# Patient Record
Sex: FEMALE | Race: WHITE | NOT HISPANIC OR LATINO | Employment: UNEMPLOYED | URBAN - NONMETROPOLITAN AREA
[De-identification: names, ages, dates, MRNs, and addresses within clinical notes are randomized per-mention and may not be internally consistent; named-entity substitution may affect disease eponyms.]

---

## 2018-02-01 ENCOUNTER — DOCUMENTATION ONLY (OUTPATIENT)
Dept: FAMILY MEDICINE | Facility: OTHER | Age: 53
End: 2018-02-01

## 2018-02-01 RX ORDER — PANTOPRAZOLE SODIUM 40 MG/1
40 TABLET, DELAYED RELEASE ORAL DAILY
COMMUNITY
Start: 2016-07-27 | End: 2022-03-22

## 2019-07-25 ENCOUNTER — HOSPITAL ENCOUNTER (OUTPATIENT)
Dept: MRI IMAGING | Facility: OTHER | Age: 54
Discharge: HOME | End: 2019-07-25
Attending: NURSE PRACTITIONER | Admitting: FAMILY MEDICINE
Payer: COMMERCIAL

## 2019-07-25 DIAGNOSIS — M51.369 DDD (DEGENERATIVE DISC DISEASE), LUMBAR: ICD-10-CM

## 2019-07-25 DIAGNOSIS — M48.061 SPINAL STENOSIS, LUMBAR REGION, WITHOUT NEUROGENIC CLAUDICATION: ICD-10-CM

## 2019-07-25 DIAGNOSIS — M54.50 LOW BACK PAIN: ICD-10-CM

## 2019-07-25 PROCEDURE — 72148 MRI LUMBAR SPINE W/O DYE: CPT

## 2020-02-01 ENCOUNTER — OFFICE VISIT (OUTPATIENT)
Dept: FAMILY MEDICINE | Facility: OTHER | Age: 55
End: 2020-02-01
Attending: NURSE PRACTITIONER
Payer: OTHER GOVERNMENT

## 2020-02-01 VITALS
RESPIRATION RATE: 16 BRPM | DIASTOLIC BLOOD PRESSURE: 60 MMHG | OXYGEN SATURATION: 98 % | TEMPERATURE: 98.2 F | WEIGHT: 118.4 LBS | BODY MASS INDEX: 20.22 KG/M2 | HEIGHT: 64 IN | HEART RATE: 84 BPM | SYSTOLIC BLOOD PRESSURE: 98 MMHG

## 2020-02-01 DIAGNOSIS — R07.0 THROAT PAIN: Primary | ICD-10-CM

## 2020-02-01 LAB
SPECIMEN SOURCE: NORMAL
STREP GROUP A PCR: NOT DETECTED

## 2020-02-01 PROCEDURE — 99203 OFFICE O/P NEW LOW 30 MIN: CPT | Performed by: NURSE PRACTITIONER

## 2020-02-01 PROCEDURE — 87651 STREP A DNA AMP PROBE: CPT | Mod: ZL | Performed by: NURSE PRACTITIONER

## 2020-02-01 ASSESSMENT — ENCOUNTER SYMPTOMS
EYES NEGATIVE: 1
FATIGUE: 0
COUGH: 1
SINUS PRESSURE: 0
CHILLS: 0
SINUS PAIN: 0
FEVER: 0
VOICE CHANGE: 1
SORE THROAT: 1
FACIAL SWELLING: 0

## 2020-02-01 ASSESSMENT — MIFFLIN-ST. JEOR: SCORE: 1132.06

## 2020-02-01 ASSESSMENT — PAIN SCALES - GENERAL: PAINLEVEL: NO PAIN (0)

## 2020-02-01 NOTE — PROGRESS NOTES
SUBJECTIVE:   Beverley Henry is a 54 year old female presenting with a chief complaint of   Chief Complaint   Patient presents with     Throat Problem       She is an established patient of [facility].    UR Adult    Onset of symptoms was 3 day(s) ago.  Course of illness is same.    Severity moderate  Current and Associated symptoms: runny nose, cough - non-productive, ear pain right and sore throat  Treatment measures tried include None tried.  Predisposing factors include None.        Review of Systems   Constitutional: Negative for chills, fatigue and fever.   HENT: Positive for ear pain, postnasal drip, sore throat and voice change. Negative for facial swelling, sinus pressure and sinus pain.    Eyes: Negative.    Respiratory: Positive for cough.        No past medical history on file.  Family History   Problem Relation Age of Onset     Breast Cancer Maternal Grandmother 40        Cancer-breast     Colon Cancer Paternal Grandmother 60        Cancer-colon     Colon Cancer Paternal Grandfather 60        Cancer-colon     Breast Cancer Maternal Aunt 58        Cancer-breast     Diabetes Maternal Aunt         Diabetes     Heart Disease Maternal Uncle         Heart Disease,CABG     Cancer Father 40        Cancer,lung cancer --smoker--mesothelioma     Family History Negative Brother         Good Health     Cancer Paternal Aunt 60        Cancer,lung cancer     Family History Negative Sister         Good Health     Anesthesia Reaction No family hx of         Anesthesia Problem     Blood Disease No family hx of         Blood Disease     Other - See Comments No family hx of         Stroke     Thyroid Disease No family hx of         Thyroid Disease     Seizure Disorder No family hx of         Seizures     Prostate Cancer No family hx of         Cancer-prostate     Ovarian Cancer No family hx of         Cancer-ovarian     Current Outpatient Medications   Medication Sig Dispense Refill     pantoprazole (PROTONIX) 40 MG EC  "tablet Take 40 mg by mouth daily       Social History     Tobacco Use     Smoking status: Never Smoker     Smokeless tobacco: Never Used   Substance Use Topics     Alcohol use: No       OBJECTIVE  BP 98/60 (BP Location: Right arm, Patient Position: Sitting, Cuff Size: Adult Regular)   Pulse 84   Temp 98.2  F (36.8  C) (Tympanic)   Resp 16   Ht 1.626 m (5' 4\")   Wt 53.7 kg (118 lb 6.4 oz)   SpO2 98%   Breastfeeding No   BMI 20.32 kg/m      Physical Exam  Vitals signs and nursing note reviewed.   Constitutional:       General: She is not in acute distress.     Appearance: Normal appearance. She is normal weight. She is not ill-appearing.   HENT:      Head: Normocephalic.      Right Ear: Tympanic membrane normal.      Left Ear: Tympanic membrane normal.      Nose: Rhinorrhea present. No congestion.      Mouth/Throat:      Mouth: Mucous membranes are moist.      Pharynx: Oropharynx is clear. Posterior oropharyngeal erythema present. No oropharyngeal exudate.   Cardiovascular:      Rate and Rhythm: Normal rate and regular rhythm.   Pulmonary:      Effort: Pulmonary effort is normal.      Breath sounds: Normal breath sounds.   Neurological:      Mental Status: She is alert.         Labs:  Results for orders placed or performed in visit on 02/01/20 (from the past 24 hour(s))   Group A Streptococcus PCR Throat Swab   Result Value Ref Range    Specimen Description Throat     Strep Group A PCR Not Detected NDET^Not Detected       X-Ray was not done.    ASSESSMENT:      ICD-10-CM    1. Throat pain R07.0 Group A Streptococcus PCR Throat Swab        Medical Decision Making:    Differential Diagnosis:  URI Adult/Peds:  Acute right otitis media, Strep pharyngitis, Viral pharyngitis and Viral upper respiratory illness    Serious Comorbid Conditions:  Adult:  None    PLAN:    URI Adult:  Tylenol, Fluids, Rest, OTC decongestant/antihistamine and Saline gargles.  Pt was concerned that she had strep and was leaving on a trip " early next week.   Reviewed self care measures, S& Sx to return.   Pt in agreement with plan and has no further questions.     Followup:    If not improving or if condition worsens, follow up with your Primary Care Provider    Patient Instructions   What am I to do (the plan):   O  Get plenty of fluids to drink.  O  Make sure that you get plenty of rest.   O  Take Acetaminophen (Tylenol) or Ibuprofen (Motrin or Advil) for fever or discomfort.  O  May try Diphenhydramine at bedtime only.  Do not use for more than 3-4 days in a row.  (Benadryl Allergy Elixir).     How will I know the plan above is not working:    O  If you have a fever above 101 degrees or more.    O  If you are having problems with your breathing (such as breathing that is fast or hard, and makes it challenging to eat or talk)  O  If you get new symptoms that you are worried about  O  If you are not better within 10 -14 days.      Who should I call if the plan is not working:  If you do not think you are getting better, your should contact your regular health care provider to discuss what you should do next.     When should I return for a follow-up visit:   Return as needed.

## 2020-02-01 NOTE — NURSING NOTE
Patient has not been feeling well for 3 days.  Their symptoms are sore throat, runny nose, ear pain.   Lacy Salcedo LPN LPN....................  2/1/2020   10:08 AM

## 2020-02-01 NOTE — PATIENT INSTRUCTIONS
What am I to do (the plan):   O  Get plenty of fluids to drink.  O  Make sure that you get plenty of rest.   O  Take Acetaminophen (Tylenol) or Ibuprofen (Motrin or Advil) for fever or discomfort.  O  May try Diphenhydramine at bedtime only.  Do not use for more than 3-4 days in a row.  (Benadryl Allergy Elixir).     How will I know the plan above is not working:    O  If you have a fever above 101 degrees or more.    O  If you are having problems with your breathing (such as breathing that is fast or hard, and makes it challenging to eat or talk)  O  If you get new symptoms that you are worried about  O  If you are not better within 10 -14 days.      Who should I call if the plan is not working:  If you do not think you are getting better, your should contact your regular health care provider to discuss what you should do next.     When should I return for a follow-up visit:   Return as needed.

## 2021-04-24 ENCOUNTER — HEALTH MAINTENANCE LETTER (OUTPATIENT)
Age: 56
End: 2021-04-24

## 2021-10-03 ENCOUNTER — HEALTH MAINTENANCE LETTER (OUTPATIENT)
Age: 56
End: 2021-10-03

## 2022-03-22 ENCOUNTER — OFFICE VISIT (OUTPATIENT)
Dept: FAMILY MEDICINE | Facility: OTHER | Age: 57
End: 2022-03-22
Attending: PHYSICIAN ASSISTANT
Payer: OTHER GOVERNMENT

## 2022-03-22 VITALS
DIASTOLIC BLOOD PRESSURE: 58 MMHG | TEMPERATURE: 97.7 F | OXYGEN SATURATION: 97 % | SYSTOLIC BLOOD PRESSURE: 102 MMHG | HEIGHT: 64 IN | BODY MASS INDEX: 18.78 KG/M2 | RESPIRATION RATE: 14 BRPM | HEART RATE: 73 BPM | WEIGHT: 110 LBS

## 2022-03-22 DIAGNOSIS — J01.90 ACUTE SINUSITIS WITH SYMPTOMS > 10 DAYS: Primary | ICD-10-CM

## 2022-03-22 PROCEDURE — 99213 OFFICE O/P EST LOW 20 MIN: CPT | Performed by: PHYSICIAN ASSISTANT

## 2022-03-22 ASSESSMENT — PAIN SCALES - GENERAL: PAINLEVEL: SEVERE PAIN (7)

## 2022-03-22 NOTE — PROGRESS NOTES
ASSESSMENT/PLAN:    I have reviewed the nursing notes.  I have reviewed the findings, diagnosis, plan and need for follow up with the patient.    1. Acute sinusitis with symptoms > 10 days  - amoxicillin-clavulanate (AUGMENTIN) 875-125 MG tablet; Take 1 tablet by mouth 2 times daily for 7 days  Dispense: 14 tablet; Refill: 0  - Vital signs stable. PE consistent with sinusitis. Discussed with patient that we recommend: to dry out congestion with flonase (1spray in both nostrils 2x daily for 3-5 days) and pseudoephedrine (1-2 tabs every 4-6 hrs for 3-5 days) unless contraindicated, use a saline spray/Neti Pot/sinus flush (Chi Med Sinus Rinse) 2-3 times daily to irrigate sinuses/mucosal tissue. This dilutes and moves secretions. Alternate Tylenol or ibuprofen for pain and fevers - alternate every 4 hours as needed. Recommend plenty of fluids and rest as needed. Discussed that if a smoker, tobacco cessation recommended. Discussed that we normally do not treat sinus infections of less than 10 days duration with oral antibiotics as these are typically viral in nature. Discussed that if an antibiotic was prescribed today for bacterial sinusitis to take the entire course of antibiotic, discussed side effect profile of prescribed medications. Patient is in agreement and understanding of the above treatment plan. All questions and concerns were addressed and answered to patient's satisfaction. AVS reviewed with patient.     Discussed warning signs/symptoms indicative of need to f/u    Follow up if symptoms persist or worsen or concerns    I explained my diagnostic considerations and recommendations to the patient, who voiced understanding and agreement with the treatment plan. All questions were answered. We discussed potential side effects of any prescribed or recommended therapies, as well as expectations for response to treatments.    Niya Brown PA-C  3/22/2022  1:08 PM    HPI:    Beverley Henry is a 56 year  "old female  who presents to [facility] today for concerns of sinus infection x over a week     Symptoms:   Location: diffuse  Nasal congestion: Yes  Purulent nasal discharge: Yes  Headache/Migraine: Yes  Facial pain and head pressure: Yes   Cough: Yes: from post nasal drip  Tooth pain/symptoms: Yes: right upper  Myalgias: Yes  Presence of fever: No   Halitosis: No   Anosmia: No    Metallic taste in the mouth: No   Ear pain: Yes    Treatments tried: none tried with no improvement.     History of similar symptoms: No  Prior workup: No    Allergies: Codeine, Macrobid    PCP: none    No past medical history on file.  Past Surgical History:   Procedure Laterality Date     ATTEMPTED ARTHROSCOPY      ,[facility]--knee Right      SECTION      .     COLONOSCOPY  2015    5/15,5 year F/U--Dr Santos-hyperplastic polyp     LAPAROSCOPIC TUBAL LIGATION      ,[place]     OTHER SURGICAL HISTORY      ,[id number],ABLATION,[place], [place]     OTHER SURGICAL HISTORY      3/10/2015,52021.0,[place] LAP VAG HYST UTERUS 250GMS/<     Social History     Tobacco Use     Smoking status: Never Smoker     Smokeless tobacco: Never Used   Substance Use Topics     Alcohol use: No     Current Outpatient Medications   Medication Sig Dispense Refill     amoxicillin-clavulanate (AUGMENTIN) 875-125 MG tablet Take 1 tablet by mouth 2 times daily for 7 days 14 tablet 0     Allergies   Allergen Reactions     Codeine Nausea and Vomiting and Dizziness     Morphine Dizziness     Nitrofurantoin      Other reaction(s): Restless Legs/Feet     Past medical history, past surgical history, current medications and allergies reviewed and accurate to the best of my knowledge.      ROS:  Refer to HPI    /58 (BP Location: Right arm, Patient Position: Sitting, Cuff Size: Adult Regular)   Pulse 73   Temp 97.7  F (36.5  C) (Tympanic)   Resp 14   Ht 1.613 m (5' 3.5\")   Wt 49.9 kg (110 lb)   SpO2 97%   Breastfeeding No   " BMI 19.18 kg/m       EXAM:  General Appearance: Well appearing 56 year old female, appropriate appearance for age. No acute distress   Ears: Left TM intact, translucent with bony landmarks appreciated, no erythema, no effusion, no bulging, no purulence.  Right TM intact, translucent with bony landmarks appreciated, no erythema, no effusion, no bulging, no purulence.  Left auditory canal clear.  Right auditory canal clear.  Normal external ears, non tender.  Eyes: conjunctivae normal without erythema or irritation, corneas clear, no drainage or crusting, no eyelid swelling, pupils equal   Orophayrnx: moist mucous membranes, posterior pharynx without erythema, tonsils symmetric, no erythema, no exudates or petechiae, no post nasal drip seen, no trismus, voice clear.    Sinuses:  Right sided maxillary and frontal sinus tenderness and fullness. Normal left sided sinus examination.   Nose:  Bilateral nares: no erythema, no edema, no drainage or congestion   Neck: supple without adenopathy  Respiratory: normal chest wall and respirations.  Normal effort.  Clear to auscultation bilaterally, no wheezing, crackles or rhonchi.  No increased work of breathing.  No cough appreciated.  Cardiac: RRR with no murmurs   Dermatological: no rashes noted of exposed skin  Psychological: normal affect, alert, oriented, and pleasant.     Labs:  None     Xray:  None

## 2022-03-22 NOTE — PATIENT INSTRUCTIONS
You were prescribed an antibiotic, please take into consideration the following information:  - Take entire course of antibiotic even if you start to feel better.  - Antibiotics can cause stomach upset including nausea and diarrhea. Read your bottle or ask the pharmacist if antibiotic can be taken with food to help prevent nausea. If you have symptoms of diarrhea you can take an over-the-counter probiotic and/or increase foods with probiotics such as yogurt, [place], sauerkraut.  -Use caution in sunlight as can lead to increased risk of sunburn while on ABX (antibiotics).     Please refer to your AVS for follow up and pain/symptoms management recommendations (I.e.: medications, helpful conservative treatment modalities, appropriate follow up if need to a specialist or family practice, etc.). Please return to urgent care if your symptoms change or worsen.     Discharge instructions:  -If you were prescribed a medication(s), please take this as prescribed/directed  -Monitor your symptoms, if changing/worsening, return to UC/ER or PCP for follow up    Sinus Infection:   1. Dry out congestion with flonase (1spray in both nostrils 2x daily for 3-5 days) and pseudoephedrine (1-2 tabs every 4-6 hrs for 3-5 days) unless contraindicated     2. Use a saline spray/Neti Pot/sinus flush (Chi Med Sinus Rinse) 2-3 times daily to irrigate sinuses/mucosal tissue. This dilutes and moves secretions.     3. Tylenol or ibuprofen for pain and fevers - alternate every 4 hours as needed. I.e.: Ibuprofen at 8am, Tylenol 12pm, Ibuprofen 4pm    -Daily maximum of Tylenol is 4000mg (recommend staying under 3000mg)   -Daily maximum of Ibuprofen is 1200mg (take no more than six 200mg pills a day)    4. Plenty of fluids and rest as needed.     5. Chew, yawn and speak to help eustachian tubes drain.     * If you are a smoker, try to quit *     - Consider the following over-the-counter products if you are older than 1 year and not pregnant:  honey/chestal for cough relief and sambucus/elderberry for viral upper-respiratory symptoms.

## 2022-03-22 NOTE — NURSING NOTE
"Chief Complaint   Patient presents with     Sinus Problem     Patient presented to the clinic with sinus issues that started about ten days ago and has not gotten better.    Initial /58 (BP Location: Right arm, Patient Position: Sitting, Cuff Size: Adult Regular)   Pulse 73   Temp 97.7  F (36.5  C) (Tympanic)   Resp 14   Ht 1.613 m (5' 3.5\")   Wt 49.9 kg (110 lb)   SpO2 97%   Breastfeeding No   BMI 19.18 kg/m   Estimated body mass index is 19.18 kg/m  as calculated from the following:    Height as of this encounter: 1.613 m (5' 3.5\").    Weight as of this encounter: 49.9 kg (110 lb).       FOOD SECURITY SCREENING QUESTIONS:    The next two questions are to help us understand your food security.  If you are feeling you need any assistance in this area, we have resources available to support you today.    Hunger Vital Signs:  Within the past 12 months we worried whether our food would run out before we got money to buy more. Never  Within the past 12 months the food we bought just didn't last and we didn't have money to get more. Never      Medication Reconciliation: Complete      Padmini Olivera LPN  "

## 2022-05-15 ENCOUNTER — HEALTH MAINTENANCE LETTER (OUTPATIENT)
Age: 57
End: 2022-05-15

## 2022-06-22 ENCOUNTER — OFFICE VISIT (OUTPATIENT)
Dept: FAMILY MEDICINE | Facility: OTHER | Age: 57
End: 2022-06-22
Attending: PHYSICIAN ASSISTANT
Payer: OTHER GOVERNMENT

## 2022-06-22 VITALS
OXYGEN SATURATION: 98 % | SYSTOLIC BLOOD PRESSURE: 102 MMHG | RESPIRATION RATE: 16 BRPM | WEIGHT: 117.6 LBS | BODY MASS INDEX: 20.51 KG/M2 | HEART RATE: 62 BPM | DIASTOLIC BLOOD PRESSURE: 60 MMHG | TEMPERATURE: 97.4 F

## 2022-06-22 DIAGNOSIS — Z80.0 FAMILY HISTORY OF COLON CANCER: ICD-10-CM

## 2022-06-22 DIAGNOSIS — Z12.11 SCREEN FOR COLON CANCER: ICD-10-CM

## 2022-06-22 DIAGNOSIS — K29.00 ACUTE GASTRITIS, PRESENCE OF BLEEDING UNSPECIFIED, UNSPECIFIED GASTRITIS TYPE: Primary | ICD-10-CM

## 2022-06-22 PROCEDURE — 99213 OFFICE O/P EST LOW 20 MIN: CPT | Performed by: PHYSICIAN ASSISTANT

## 2022-06-22 RX ORDER — PANTOPRAZOLE SODIUM 40 MG/1
40 TABLET, DELAYED RELEASE ORAL PRN
Qty: 90 TABLET | Refills: 3 | Status: SHIPPED | OUTPATIENT
Start: 2022-06-22 | End: 2023-11-16

## 2022-06-22 RX ORDER — PANTOPRAZOLE SODIUM 40 MG/1
40 TABLET, DELAYED RELEASE ORAL PRN
COMMUNITY
End: 2022-06-22

## 2022-06-22 ASSESSMENT — PAIN SCALES - GENERAL: PAINLEVEL: NO PAIN (0)

## 2022-06-22 NOTE — NURSING NOTE
Pt presents to clinic today for a follow up for gastric ulcer. Patient states she has a chronic history of gastric ulcers and is following up to get a possible refill of pantoprazole.   patient states she had a flare up last week.     FOOD SECURITY SCREENING QUESTIONS:    The next two questions are to help us understand your food security.  If you are feeling you need any assistance in this area, we have resources available to support you today.    Hunger Vital Signs:  Within the past 12 months we worried whether our food would run out before we got money to buy more. Never  Within the past 12 months the food we bought just didn't last and we didn't have money to get more. Never        Medication Reconciliation: Radhika Pineda LPN,LPN on 6/22/2022 at 8:29 AM

## 2022-06-22 NOTE — PROGRESS NOTES
Assessment & Plan   Problem List Items Addressed This Visit        Other    Family history of colon cancer    Relevant Orders    Adult GI  Referral - Procedure Only      Other Visit Diagnoses     Acute gastritis, presence of bleeding unspecified, unspecified gastritis type    -  Primary    Relevant Medications    pantoprazole (PROTONIX) 40 MG EC tablet    Other Relevant Orders    Adult GI  Referral - Procedure Only    Screen for colon cancer        Relevant Orders    Adult GI  Referral - Procedure Only         Referred for colonoscopy and EGD for monitoring and to rule out concerns.  Refill pantoprazole.  Encouraged to increase dietary intake along with supplementation for calcium and vitamin D.  Gave warning signs and symptoms.    Offered a mammogram however patient declined at this time.  Encouraged to set up a full physical.  Recheck as needed.       See Patient Instructions    Return if symptoms worsen or fail to improve, for Physical Exam.    Shae Zhou PA-C  [facility]    Traci Lowery is a 56 year old, presenting for the following health issues:  Follow Up (Gastric ulcers)      History of Present Illness       Reason for visit:  Refill    She eats 2-3 servings of fruits and vegetables daily.She consumes 0 sweetened beverage(s) daily.She exercises with enough effort to increase her heart rate 10 to 19 minutes per day.  She exercises with enough effort to increase her heart rate 3 or less days per week.        Follow up for a flare up of gastric ulcer. See nursing note.     Patient is history of gastritis and a gastric ulcer.  States that stress flares up her gastritis.  Typically has to take approximately 7 days of pantoprazole to help alleviate the symptoms.  History of EGD and colonoscopy in the past.  Due for repeat colonoscopy.  Last EGD was approximately 18 years ago.  Symptoms typically flare every 6 to 18 months.  Not irritated by food.  Gets  epigastric and right upper quadrant abdominal pain with a flare.  No blood in the stool or black tarry stools.  Otherwise feeling fine.  Had a flareup last week.  Currently feeling fine.  Family history of colon cancer.    Due for mammogram and physical.  Declines mammogram at this time.      Review of Systems   Constitutional, HEENT, cardiovascular, pulmonary, gi and gu systems are negative, except as otherwise noted.      Objective    /60 (BP Location: Right arm, Patient Position: Sitting, Cuff Size: Adult Regular)   Pulse 62   Temp 97.4  F (36.3  C) (Tympanic)   Resp 16   Wt 53.3 kg (117 lb 9.6 oz)   LMP  (LMP Unknown)   SpO2 98%   Breastfeeding No   BMI 20.51 kg/m    Body mass index is 20.51 kg/m .  Physical Exam  Vitals and nursing note reviewed.   Constitutional:       Appearance: Normal appearance.   HENT:      Head: Normocephalic and atraumatic.   Eyes:      Extraocular Movements: Extraocular movements intact.      Conjunctiva/sclera: Conjunctivae normal.      Pupils: Pupils are equal, round, and reactive to light.   Cardiovascular:      Rate and Rhythm: Normal rate and regular rhythm.      Heart sounds: Normal heart sounds.   Pulmonary:      Effort: Pulmonary effort is normal.      Breath sounds: Normal breath sounds.   Abdominal:      General: Abdomen is flat. Bowel sounds are normal. There is no distension.      Palpations: Abdomen is soft.      Tenderness: There is no abdominal tenderness. There is no guarding or rebound.   Musculoskeletal:         General: Normal range of motion.   Skin:     General: Skin is warm and dry.   Neurological:      General: No focal deficit present.      Mental Status: She is alert and oriented to person, place, and time.   Psychiatric:         Mood and Affect: Mood normal.         Behavior: Behavior normal.                  .  ..

## 2022-06-27 ENCOUNTER — TELEPHONE (OUTPATIENT)
Dept: SURGERY | Facility: OTHER | Age: 57
End: 2022-06-27

## 2022-06-27 NOTE — TELEPHONE ENCOUNTER
[facility] Diagnostic Referral    Patient has a referral for a EGD/Colonoscopy with a diagnosis of recurrent gastritis/screening.    Please advise.    Thank you,Mariajose Mueller on 6/27/2022 at 9:01 AM

## 2022-09-11 ENCOUNTER — HEALTH MAINTENANCE LETTER (OUTPATIENT)
Age: 57
End: 2022-09-11

## 2022-11-08 ENCOUNTER — OFFICE VISIT (OUTPATIENT)
Dept: OBGYN | Facility: OTHER | Age: 57
End: 2022-11-08
Attending: STUDENT IN AN ORGANIZED HEALTH CARE EDUCATION/TRAINING PROGRAM
Payer: COMMERCIAL

## 2022-11-08 VITALS
WEIGHT: 118.8 LBS | SYSTOLIC BLOOD PRESSURE: 102 MMHG | HEART RATE: 86 BPM | DIASTOLIC BLOOD PRESSURE: 72 MMHG | BODY MASS INDEX: 20.71 KG/M2

## 2022-11-08 DIAGNOSIS — N89.8 VAGINAL ITCHING: ICD-10-CM

## 2022-11-08 DIAGNOSIS — R35.0 URINARY FREQUENCY: Primary | ICD-10-CM

## 2022-11-08 LAB
ALBUMIN UR-MCNC: NEGATIVE MG/DL
APPEARANCE UR: CLEAR
BACTERIA #/AREA URNS HPF: ABNORMAL /HPF
BILIRUB UR QL STRIP: NEGATIVE
CLUE CELLS: ABNORMAL
COLOR UR AUTO: YELLOW
GLUCOSE UR STRIP-MCNC: NEGATIVE MG/DL
HGB UR QL STRIP: ABNORMAL
KETONES UR STRIP-MCNC: NEGATIVE MG/DL
LEUKOCYTE ESTERASE UR QL STRIP: ABNORMAL
NITRATE UR QL: NEGATIVE
PH UR STRIP: 6.5 [PH] (ref 5–9)
RBC URINE: 3 /HPF
SP GR UR STRIP: 1.01 (ref 1–1.03)
SQUAMOUS EPITHELIAL: 3 /HPF
TRICHOMONAS, WET PREP: ABNORMAL
UROBILINOGEN UR STRIP-MCNC: NORMAL MG/DL
WBC URINE: 4 /HPF
WBC'S/HIGH POWER FIELD, WET PREP: ABNORMAL
YEAST, WET PREP: ABNORMAL

## 2022-11-08 PROCEDURE — 99417 PROLNG OP E/M EACH 15 MIN: CPT | Performed by: STUDENT IN AN ORGANIZED HEALTH CARE EDUCATION/TRAINING PROGRAM

## 2022-11-08 PROCEDURE — 87210 SMEAR WET MOUNT SALINE/INK: CPT | Mod: ZL | Performed by: STUDENT IN AN ORGANIZED HEALTH CARE EDUCATION/TRAINING PROGRAM

## 2022-11-08 PROCEDURE — 87086 URINE CULTURE/COLONY COUNT: CPT | Mod: ZL | Performed by: STUDENT IN AN ORGANIZED HEALTH CARE EDUCATION/TRAINING PROGRAM

## 2022-11-08 PROCEDURE — 99205 OFFICE O/P NEW HI 60 MIN: CPT | Performed by: STUDENT IN AN ORGANIZED HEALTH CARE EDUCATION/TRAINING PROGRAM

## 2022-11-08 PROCEDURE — 81001 URINALYSIS AUTO W/SCOPE: CPT | Mod: ZL | Performed by: STUDENT IN AN ORGANIZED HEALTH CARE EDUCATION/TRAINING PROGRAM

## 2022-11-08 NOTE — NURSING NOTE
Pt presents to clinic today for hot flashes, increase urination sensation and painful intercourse.     Medication Reconciliation: complete  Mari Garcia LPN

## 2022-11-08 NOTE — PROGRESS NOTES
Gynecology Office Visit    Chief Complaint: Menopause symptoms    HPI:    Beverley Henry is a 57 year old , here for discussion of menopause symptoms. She describes hot flashes over the past year. The hot flashes are associated with a short bursts of anxiety, dread and agitation. They happen approximately 6-7 times each day.     She also describes some discomfort with urination and feels like sometimes her bladder becomes really full. She feels like she empties well when she urinates, but also goes to the bathroom more than she used to. No bleeding noted in her urine and no fevers or distinct dysuria.    The most bothersome symptom is the change in pain now with intercourse. She notes that on insertion, the pain in burning and stinging in character. She has to stop at that time due to severe pain. She denies any bleeding.     OBHx  OB History    Para Term  AB Living   4 4 0 0 0 0   SAB IAB Ectopic Multiple Live Births   0 0 0 0 0       GYN history:   No history of STIs   Last pap smear: 2015 NIL, No history of abnormal pap smears:   Reviewed pathology from hysterectomy in  which revealed all benign tissue. Cervix was removed at time of hysterectomy. Ovaries remained in place.    Past medical history:  Patient Active Problem List   Diagnosis     Family history of colon cancer     Uterine leiomyoma     History of gastritis     History of uterine fibroid     Menorrhagia     Specifically denies VTE, DM, HTN or bleeding disorders    Past Surgical History:  Past Surgical History:   Procedure Laterality Date     ATTEMPTED ARTHROSCOPY      ,[place] Doshi--knee Right      SECTION      .2004     COLONOSCOPY  2015    5/15,5 year F/U--Dr Santos-hyperplastic polyp     EGD      approx , gastritis     HYSTERECTOMY, PAP NO LONGER INDICATED       LAPAROSCOPIC TUBAL LIGATION      ,[place]     OTHER SURGICAL HISTORY      ,2086,ABLATION,[place], [place]      OTHER SURGICAL HISTORY      3/10/2015,84754.0,AK LAP VAG HYST UTERUS 250GMS/<       Medications:  Current Outpatient Medications   Medication     pantoprazole (PROTONIX) 40 MG EC tablet     No current facility-administered medications for this visit.       Allergies:       Allergies   Allergen Reactions     Codeine Nausea and Vomiting and Dizziness     Morphine Dizziness     Nitrofurantoin      Other reaction(s): Restless Legs/Feet       Social History:  Social History     Tobacco Use     Smoking status: Never     Smokeless tobacco: Never   Vaping Use     Vaping Use: Never used   Substance Use Topics     Alcohol use: No     Drug use: Unknown     Types: Other     Comment: Drug use: No   Lives at home with her     Family History:  Family History   Problem Relation Age of Onset     Stomach Problem Mother         gastritis     Cancer Father 40        Cancer,lung cancer --smoker--mesothelioma     Family History Negative Sister         Good Health     Stomach Problem Brother         gastritis     Breast Cancer Maternal Grandmother 40        Cancer-breast     Colon Cancer Paternal Grandmother 60        Cancer-colon     Colon Cancer Paternal Grandfather 60        Cancer-colon     Breast Cancer Maternal Aunt 58        Cancer-breast     Diabetes Maternal Aunt         Diabetes     Heart Disease Maternal Uncle         Heart Disease,CABG     Cancer Paternal Aunt 60        Cancer,lung cancer     Anesthesia Reaction No family hx of         Anesthesia Problem     Blood Disease No family hx of         Blood Disease     Other - See Comments No family hx of         Stroke     Thyroid Disease No family hx of         Thyroid Disease     Seizure Disorder No family hx of         Seizures     Prostate Cancer No family hx of         Cancer-prostate     Ovarian Cancer No family hx of         Cancer-ovarian   Maternal grandmother had breast cancer  Maternal aunt had breast cancer    Specifically denies ovarian, pancreatic  cancers  Specifically denies VTE, known familial thrombophilias and coagulopathies    ROS:   Respiratory: No shortness of breath, dyspnea on exertion, cough, or hemoptysis  Cardiovascular: negative for palpitations, chest pain, lower extremity edema and syncope or near-syncope  Gastrointestinal: negative for, nausea, vomiting and hematemesis  Genitourinary: + vulvar irritation  Musculoskeletal: negative for, back pain and muscular weakness  Psychiatric: negative for, anxiety, depression and hallucinations  Hematologic/Lymphatic/Immunologic: negative for, anemia, chills and fever      Physical Exam  /72   Pulse 86   Wt 53.9 kg (118 lb 12.8 oz)   LMP  (LMP Unknown)   BMI 20.71 kg/m      Gen: Well-appearing, no acute distressed, well-groomed, alert  HEENT: Normocephalic, atraumatic  Cardiovascular: Regular rate  Pulm: Symmetric chest rise, non-labored respirations  Abd: Soft, non-tender, non-distended  Ext: No LE edema, extremities warm and well perfused  Pelvic:  Normal appearing external female genitalia. Normal hair distribution. Vagina is without lesions, loss of ruggae and mild atrophic changes present. There is scant, thin, white discharge. Vaginal cuff is intact, no sign of ulceration, nodularity of dehiscence. No sign of clinically significant cystocele or rectocele.    PVR: 28 mL    Assessment/Plan  Beverley Henry is a 57 year old  female here for discussion of menopausal symptoms.     # Vasomotor symptoms of menopause  -- Declines any interest in hormonal options currently  -- UA, Wet prep sent today    We discussed non-hormonal options to include:  - Paroxetine 7.5mg/day  - gabapentin  - Clonidine 0.1mg/day (Will not use if BP low- not a great choice for her a her BP today was 102/72 and she notes this is high for her)    # Vaginal irritation: likely related to   -- Over the counter vaginal moisturizers as well as vaginal dilators  -- May be interested in vaginal estrogen or pelvic floor  PT if these symptoms do not improve. At this time hoping to avoid hormonal therapies    In further discussion of her risk factors she does have a family history of breast cancer (maternal grandmother and aunt) No personal history of breast cancer. She has never had a VTE and does not have any family history of thrombophilias. She does not smoke.    Total amount of time spent during today's encounter including chart prep, face to face, documentation was 90 minutes.     CHIKIS PERKINS MD on 11/8/2022 at 12:49 PM

## 2022-11-10 LAB — BACTERIA UR CULT: NORMAL

## 2022-11-11 ENCOUNTER — TELEPHONE (OUTPATIENT)
Dept: OBGYN | Facility: OTHER | Age: 57
End: 2022-11-11

## 2022-11-11 NOTE — TELEPHONE ENCOUNTER
Called and spoke to Patient after verifying last name and date of birth. Patient given wet prep and UA and culture results. Patient verbalized understanding and had no questions.     Antonieta Houston RN on 11/11/2022 at 9:25 AM

## 2022-11-11 NOTE — TELEPHONE ENCOUNTER
Reason for call: Request for results.    Name of test or procedure: Labwork    Date of test or procedure: 11/08/2022    Location of test or procedure: [place]    Preferred method for responding to this message: Telephone Call    Phone number patient can be reached at: Cell number on file:    Telephone Information:   Mobile [phone]       If we can't reach you directly, may we leave a detailed response at the number you provided?Yes     Patient has an appointment this morning with another provider.  She is hoping to have the results before 10 am to share with that provider.

## 2023-03-27 ENCOUNTER — OFFICE VISIT (OUTPATIENT)
Dept: FAMILY MEDICINE | Facility: OTHER | Age: 58
End: 2023-03-27
Attending: STUDENT IN AN ORGANIZED HEALTH CARE EDUCATION/TRAINING PROGRAM
Payer: COMMERCIAL

## 2023-03-27 VITALS
DIASTOLIC BLOOD PRESSURE: 62 MMHG | HEIGHT: 64 IN | TEMPERATURE: 97.3 F | RESPIRATION RATE: 16 BRPM | SYSTOLIC BLOOD PRESSURE: 108 MMHG | BODY MASS INDEX: 20.38 KG/M2 | OXYGEN SATURATION: 98 % | WEIGHT: 119.4 LBS | HEART RATE: 60 BPM

## 2023-03-27 DIAGNOSIS — N95.1 VAGINAL DRYNESS, MENOPAUSAL: ICD-10-CM

## 2023-03-27 DIAGNOSIS — Z00.00 ROUTINE GENERAL MEDICAL EXAMINATION AT A HEALTH CARE FACILITY: Primary | ICD-10-CM

## 2023-03-27 DIAGNOSIS — R35.0 URINE FREQUENCY: ICD-10-CM

## 2023-03-27 LAB
ALBUMIN SERPL BCG-MCNC: 4.2 G/DL (ref 3.5–5.2)
ALBUMIN UR-MCNC: NEGATIVE MG/DL
ALP SERPL-CCNC: 106 U/L (ref 35–104)
ALT SERPL W P-5'-P-CCNC: 15 U/L (ref 10–35)
ANION GAP SERPL CALCULATED.3IONS-SCNC: 7 MMOL/L (ref 7–15)
APPEARANCE UR: CLEAR
AST SERPL W P-5'-P-CCNC: 17 U/L (ref 10–35)
BASOPHILS # BLD AUTO: 0 10E3/UL (ref 0–0.2)
BASOPHILS NFR BLD AUTO: 1 %
BILIRUB SERPL-MCNC: 0.4 MG/DL
BILIRUB UR QL STRIP: NEGATIVE
BUN SERPL-MCNC: 13.4 MG/DL (ref 6–20)
CALCIUM SERPL-MCNC: 9.3 MG/DL (ref 8.6–10)
CHLORIDE SERPL-SCNC: 105 MMOL/L (ref 98–107)
CHOLEST SERPL-MCNC: 234 MG/DL
COLOR UR AUTO: ABNORMAL
CREAT SERPL-MCNC: 0.86 MG/DL (ref 0.51–0.95)
DEPRECATED HCO3 PLAS-SCNC: 29 MMOL/L (ref 22–29)
EOSINOPHIL # BLD AUTO: 0.1 10E3/UL (ref 0–0.7)
EOSINOPHIL NFR BLD AUTO: 2 %
ERYTHROCYTE [DISTWIDTH] IN BLOOD BY AUTOMATED COUNT: 12.2 % (ref 10–15)
GFR SERPL CREATININE-BSD FRML MDRD: 79 ML/MIN/1.73M2
GLUCOSE SERPL-MCNC: 97 MG/DL (ref 70–99)
GLUCOSE UR STRIP-MCNC: NEGATIVE MG/DL
HBA1C MFR BLD: 5.8 % (ref 4–6.2)
HCT VFR BLD AUTO: 41.4 % (ref 35–47)
HDLC SERPL-MCNC: 62 MG/DL
HGB BLD-MCNC: 14.6 G/DL (ref 11.7–15.7)
HGB UR QL STRIP: NEGATIVE
HOLD SPECIMEN: NORMAL
IMM GRANULOCYTES # BLD: 0 10E3/UL
IMM GRANULOCYTES NFR BLD: 0 %
KETONES UR STRIP-MCNC: NEGATIVE MG/DL
LDLC SERPL CALC-MCNC: 146 MG/DL
LEUKOCYTE ESTERASE UR QL STRIP: ABNORMAL
LYMPHOCYTES # BLD AUTO: 1.8 10E3/UL (ref 0.8–5.3)
LYMPHOCYTES NFR BLD AUTO: 36 %
MCH RBC QN AUTO: 31.8 PG (ref 26.5–33)
MCHC RBC AUTO-ENTMCNC: 35.3 G/DL (ref 31.5–36.5)
MCV RBC AUTO: 90 FL (ref 78–100)
MONOCYTES # BLD AUTO: 0.4 10E3/UL (ref 0–1.3)
MONOCYTES NFR BLD AUTO: 8 %
MUCOUS THREADS #/AREA URNS LPF: PRESENT /LPF
NEUTROPHILS # BLD AUTO: 2.7 10E3/UL (ref 1.6–8.3)
NEUTROPHILS NFR BLD AUTO: 53 %
NITRATE UR QL: NEGATIVE
NONHDLC SERPL-MCNC: 172 MG/DL
NRBC # BLD AUTO: 0 10E3/UL
NRBC BLD AUTO-RTO: 0 /100
PH UR STRIP: 7 [PH] (ref 5–9)
PLATELET # BLD AUTO: 261 10E3/UL (ref 150–450)
POTASSIUM SERPL-SCNC: 4 MMOL/L (ref 3.4–5.3)
PROT SERPL-MCNC: 7.1 G/DL (ref 6.4–8.3)
RBC # BLD AUTO: 4.59 10E6/UL (ref 3.8–5.2)
RBC URINE: 1 /HPF
SODIUM SERPL-SCNC: 141 MMOL/L (ref 136–145)
SP GR UR STRIP: 1.02 (ref 1–1.03)
TRIGL SERPL-MCNC: 130 MG/DL
UROBILINOGEN UR STRIP-MCNC: NORMAL MG/DL
WBC # BLD AUTO: 5.1 10E3/UL (ref 4–11)
WBC URINE: 2 /HPF

## 2023-03-27 PROCEDURE — 80061 LIPID PANEL: CPT | Mod: ZL | Performed by: STUDENT IN AN ORGANIZED HEALTH CARE EDUCATION/TRAINING PROGRAM

## 2023-03-27 PROCEDURE — 81001 URINALYSIS AUTO W/SCOPE: CPT | Mod: ZL | Performed by: STUDENT IN AN ORGANIZED HEALTH CARE EDUCATION/TRAINING PROGRAM

## 2023-03-27 PROCEDURE — 80053 COMPREHEN METABOLIC PANEL: CPT | Mod: ZL | Performed by: STUDENT IN AN ORGANIZED HEALTH CARE EDUCATION/TRAINING PROGRAM

## 2023-03-27 PROCEDURE — 85025 COMPLETE CBC W/AUTO DIFF WBC: CPT | Mod: ZL | Performed by: STUDENT IN AN ORGANIZED HEALTH CARE EDUCATION/TRAINING PROGRAM

## 2023-03-27 PROCEDURE — 36415 COLL VENOUS BLD VENIPUNCTURE: CPT | Mod: ZL | Performed by: STUDENT IN AN ORGANIZED HEALTH CARE EDUCATION/TRAINING PROGRAM

## 2023-03-27 PROCEDURE — 83036 HEMOGLOBIN GLYCOSYLATED A1C: CPT | Mod: ZL | Performed by: STUDENT IN AN ORGANIZED HEALTH CARE EDUCATION/TRAINING PROGRAM

## 2023-03-27 PROCEDURE — 99213 OFFICE O/P EST LOW 20 MIN: CPT | Mod: 25 | Performed by: STUDENT IN AN ORGANIZED HEALTH CARE EDUCATION/TRAINING PROGRAM

## 2023-03-27 PROCEDURE — 87086 URINE CULTURE/COLONY COUNT: CPT | Mod: ZL | Performed by: STUDENT IN AN ORGANIZED HEALTH CARE EDUCATION/TRAINING PROGRAM

## 2023-03-27 PROCEDURE — 99396 PREV VISIT EST AGE 40-64: CPT | Performed by: STUDENT IN AN ORGANIZED HEALTH CARE EDUCATION/TRAINING PROGRAM

## 2023-03-27 RX ORDER — ESTRADIOL 0.1 MG/G
CREAM VAGINAL
Qty: 42.5 G | Refills: 3 | Status: SHIPPED | OUTPATIENT
Start: 2023-03-27

## 2023-03-27 ASSESSMENT — ENCOUNTER SYMPTOMS
BREAST MASS: 0
FREQUENCY: 1
NAUSEA: 0
SORE THROAT: 0
DYSURIA: 0
HEARTBURN: 0
JOINT SWELLING: 0
DIARRHEA: 0
CONSTIPATION: 0
ABDOMINAL PAIN: 0
HEMATURIA: 0
PARESTHESIAS: 0
EYE PAIN: 0
WEAKNESS: 0
NERVOUS/ANXIOUS: 0
ARTHRALGIAS: 0
MYALGIAS: 0
PALPITATIONS: 0
CHILLS: 0
DIZZINESS: 0
COUGH: 0
HEADACHES: 0
SHORTNESS OF BREATH: 0
HEMATOCHEZIA: 0

## 2023-03-27 ASSESSMENT — ANXIETY QUESTIONNAIRES
7. FEELING AFRAID AS IF SOMETHING AWFUL MIGHT HAPPEN: NOT AT ALL
7. FEELING AFRAID AS IF SOMETHING AWFUL MIGHT HAPPEN: NOT AT ALL
GAD7 TOTAL SCORE: 0
6. BECOMING EASILY ANNOYED OR IRRITABLE: NOT AT ALL
4. TROUBLE RELAXING: NOT AT ALL
2. NOT BEING ABLE TO STOP OR CONTROL WORRYING: NOT AT ALL
1. FEELING NERVOUS, ANXIOUS, OR ON EDGE: NOT AT ALL
GAD7 TOTAL SCORE: 0
8. IF YOU CHECKED OFF ANY PROBLEMS, HOW DIFFICULT HAVE THESE MADE IT FOR YOU TO DO YOUR WORK, TAKE CARE OF THINGS AT HOME, OR GET ALONG WITH OTHER PEOPLE?: NOT DIFFICULT AT ALL
3. WORRYING TOO MUCH ABOUT DIFFERENT THINGS: NOT AT ALL
GAD7 TOTAL SCORE: 0
IF YOU CHECKED OFF ANY PROBLEMS ON THIS QUESTIONNAIRE, HOW DIFFICULT HAVE THESE PROBLEMS MADE IT FOR YOU TO DO YOUR WORK, TAKE CARE OF THINGS AT HOME, OR GET ALONG WITH OTHER PEOPLE: NOT DIFFICULT AT ALL
5. BEING SO RESTLESS THAT IT IS HARD TO SIT STILL: NOT AT ALL

## 2023-03-27 ASSESSMENT — PATIENT HEALTH QUESTIONNAIRE - PHQ9
SUM OF ALL RESPONSES TO PHQ QUESTIONS 1-9: 0
10. IF YOU CHECKED OFF ANY PROBLEMS, HOW DIFFICULT HAVE THESE PROBLEMS MADE IT FOR YOU TO DO YOUR WORK, TAKE CARE OF THINGS AT HOME, OR GET ALONG WITH OTHER PEOPLE: NOT DIFFICULT AT ALL
SUM OF ALL RESPONSES TO PHQ QUESTIONS 1-9: 0

## 2023-03-27 ASSESSMENT — PAIN SCALES - GENERAL: PAINLEVEL: NO PAIN (0)

## 2023-03-27 NOTE — NURSING NOTE
Chief Complaint   Patient presents with     Physical     Patient presents to the clinic today for a physical  Medication Reconciliation: completed   Katia Monaco LPN  3/27/2023 8:03 AM

## 2023-03-27 NOTE — RESULT ENCOUNTER NOTE
Please call with results--patient does not access her mychart.   Cholesterol is elevated at 234. Not yet at a risk level that needs medication treatment. Ways to lower this: increase fiber in your diet, decrease animal fats, increase exercise.   Urine looks ok/not infected.   A1c (diabetes screening lab) is slightly elevated and puts you in the prediabetes range. Ways to treat this: increase exercise, increase fiber in your diet, decrease processed foods.   All other labs in the normal or expected range

## 2023-03-27 NOTE — PROGRESS NOTES
SUBJECTIVE:   CC: Bevelrey is an 57 year old who presents for preventive health visit.   No flowsheet data found.  Patient has been advised of split billing requirements and indicates understanding: Yes  Healthy Habits:     Getting at least 3 servings of Calcium per day:  Yes    Bi-annual eye exam:  Yes    Dental care twice a year:  NO    Sleep apnea or symptoms of sleep apnea:  None    Diet:  Regular (no restrictions)    Frequency of exercise:  None    Taking medications regularly:  Not Applicable    Medication side effects:  Not applicable    PHQ-2 Total Score: 0    Additional concerns today:  No    Last pap: s/p total hysterectomy    FH of early CA?:father--mesothelioma 40s  Cholesterol/DM concerns/screening: frequent urination   Tobacco?: no  Alcohol: no  Drug use: no   Exercise: no  Calcium intake: yes  DEXA:n/a  Last mammo: due   Colon CA screening: due   Immunizations: covid, flu, zoster, tdap    1. Increased urination. Gets up at least twice a night to urinate. Going through menopause. Hot flash --> urination sensation. Had seen obgyn and bladder emptying out. Mood then hot flash.   2. Vaginal dryness. Painful intercourse           Social History     Tobacco Use     Smoking status: Never     Smokeless tobacco: Never   Substance Use Topics     Alcohol use: No        Today's PHQ-2 Score:   PHQ-2 ( 1999 Pfizer) 3/27/2023   Q1: Little interest or pleasure in doing things 0   Q2: Feeling down, depressed or hopeless 0   PHQ-2 Score 0   PHQ-2 Total Score (12-17 Years)- Positive if 3 or more points; Administer PHQ-A if positive -   Q1: Little interest or pleasure in doing things Not at all   Q2: Feeling down, depressed or hopeless Not at all   PHQ-2 Score 0       Social History     Tobacco Use     Smoking status: Never     Smokeless tobacco: Never   Substance Use Topics     Alcohol use: No         Alcohol Use 3/27/2023   Prescreen: >3 drinks/day or >7 drinks/week? Not Applicable     Reviewed orders with patient.   Reviewed health maintenance and updated orders accordingly - Yes  Lab work is in process    Breast Cancer Screening:  Any new diagnosis of family breast, ovarian, or bowel cancer? No    FHS-7: No flowsheet data found.    Mammogram Screening: Recommended mammography every 1-2 years with patient discussion and risk factor consideration  Pertinent mammograms are reviewed under the imaging tab.    History of abnormal Pap smear: Status post benign hysterectomy. Health Maintenance and Surgical History updated.     Reviewed and updated as needed this visit by clinical staff   Tobacco  Allergies  Meds   Med Hx  Surg Hx           Reviewed and updated as needed this visit by Provider   Tobacco   Meds   Med Hx  Surg Hx          History reviewed. No pertinent past medical history.   Past Surgical History:   Procedure Laterality Date     ATTEMPTED ARTHROSCOPY      ,Lloyd Doshi--knee Right      SECTION      .     COLONOSCOPY  2015    5/15,5 year F/U--Dr Santos-hyperplastic polyp     EGD      approx , gastritis     HYSTERECTOMY, PAP NO LONGER INDICATED       LAPAROSCOPIC TUBAL LIGATION      ,[place]     OTHER SURGICAL HISTORY      ,[id number],ABLATION,[place], [place]     OTHER SURGICAL HISTORY      3/10/2015,58680.0,[place] LAP VAG HYST UTERUS 250GMS/<     OB History    Para Term  AB Living   4 3 3 0 1 3   SAB IAB Ectopic Multiple Live Births   0 0 0 1 0      # Outcome Date GA Lbr Matthew/2nd Weight Sex Delivery Anes PTL Lv   4A Term 93    M       4B Term 93    M       3 AB            2 Term            1 Term                Review of Systems  CONSTITUTIONAL:POSITIVE  for hot flashes  INTEGUMENTARY/SKIN: NEGATIVE for worrisome rashes, moles or lesions  EYES: NEGATIVE for vision changes or irritation  ENT: NEGATIVE for ear, mouth and throat problems  RESP: NEGATIVE for significant cough or SOB  BREAST: NEGATIVE for masses, tenderness or discharge  CV: NEGATIVE  "for chest pain, palpitations or peripheral edema  GI: NEGATIVE for nausea, abdominal pain, heartburn, or change in bowel habits   menopausal female: POSITIVE for vaginal dryness and increased urine frequency   MUSCULOSKELETAL: NEGATIVE for significant arthralgias or myalgia  NEURO: NEGATIVE for weakness, dizziness or paresthesias  PSYCHIATRIC: NEGATIVE for changes in mood or affect      OBJECTIVE:   /62 (BP Location: Right arm, Patient Position: Sitting, Cuff Size: Adult Regular)   Pulse 60   Temp 97.3  F (36.3  C) (Temporal)   Resp 16   Ht 1.613 m (5' 3.5\")   Wt 54.2 kg (119 lb 6.4 oz)   LMP  (LMP Unknown)   SpO2 98%   BMI 20.82 kg/m    Physical Exam  GENERAL: healthy, alert and no distress  EYES: Eyes grossly normal to inspection, PERRL and conjunctivae and sclerae normal  RESP: lungs clear to auscultation - no rales, rhonchi or wheezes  CV: regular rate and rhythm, normal S1 S2, no S3 or S4, no murmur, click or rub, no peripheral edema and peripheral pulses strong  ABDOMEN: soft, nontender, no hepatosplenomegaly, no masses and bowel sounds normal  MS: no gross musculoskeletal defects noted, no edema  PSYCH: mentation appears normal, affect normal/bright    Diagnostic Test Results:  Labs reviewed in Epic    ASSESSMENT/PLAN:   Beverley was seen today for physical.    Diagnoses and all orders for this visit:    Routine general medical examination at a health care facility  -     Comprehensive Metabolic Panel; Future  -     Lipid Panel; Future  -     UA reflex to Microscopic and Culture; Future  -     CBC and Differential; Future  -     Colonoscopy Screening  Referral; Future  -     MA Screen Bilateral w/Michelet; Future  -     CBC and Differential  -     UA reflex to Microscopic and Culture  -     Lipid Panel  -     Comprehensive Metabolic Panel    Vaginal dryness, menopausal  Already tried lubrication and Replens. Discussed pelvic floor PT for urine urgency as noted in HPI. Due to on going " vaginal dryness, patient interested in treatment. Reviewed benefits vs risks and side effects of medication and patient in agreement to start taking.   -     estradiol (ESTRACE) 0.1 MG/GM vaginal cream; Apply 2 g daily for 2 weeks, then decrease frequency to 2 g twice weekly.    Other orders  -     Extra Tube; Future  -     Extra Tube              COUNSELING:  Reviewed preventive health counseling, as reflected in patient instructions       Regular exercise       Healthy diet/nutrition       Colorectal Cancer Screening       Consider Hep C screening for all patients one time for ages 18-79 years       HIV screeninx in teen years, 1x in adult years, and at intervals if high risk        She reports that she has never smoked. She has never used smokeless tobacco.      Jessa Camargo MD  [facility]  Answers for HPI/ROS submitted by the patient on 3/27/2023  If you checked off any problems, how difficult have these problems made it for you to do your work, take care of things at home, or get along with other people?: Not difficult at all  PHQ9 TOTAL SCORE: 0  VIANCA 7 TOTAL SCORE: 0

## 2023-03-29 LAB — BACTERIA UR CULT: NORMAL

## 2023-04-20 ENCOUNTER — OFFICE VISIT (OUTPATIENT)
Dept: FAMILY MEDICINE | Facility: OTHER | Age: 58
End: 2023-04-20
Attending: STUDENT IN AN ORGANIZED HEALTH CARE EDUCATION/TRAINING PROGRAM
Payer: COMMERCIAL

## 2023-04-20 VITALS
TEMPERATURE: 98 F | WEIGHT: 121.6 LBS | OXYGEN SATURATION: 97 % | HEART RATE: 72 BPM | HEIGHT: 64 IN | BODY MASS INDEX: 20.76 KG/M2 | RESPIRATION RATE: 16 BRPM | SYSTOLIC BLOOD PRESSURE: 100 MMHG | DIASTOLIC BLOOD PRESSURE: 60 MMHG

## 2023-04-20 DIAGNOSIS — L50.9 URTICARIA: Primary | ICD-10-CM

## 2023-04-20 DIAGNOSIS — R73.03 PREDIABETES: ICD-10-CM

## 2023-04-20 DIAGNOSIS — E78.2 MIXED HYPERLIPIDEMIA: ICD-10-CM

## 2023-04-20 PROCEDURE — 99214 OFFICE O/P EST MOD 30 MIN: CPT | Performed by: STUDENT IN AN ORGANIZED HEALTH CARE EDUCATION/TRAINING PROGRAM

## 2023-04-20 RX ORDER — CETIRIZINE HYDROCHLORIDE 5 MG/1
10 TABLET ORAL DAILY
Qty: 30 TABLET | Refills: 3 | Status: SHIPPED | OUTPATIENT
Start: 2023-04-20 | End: 2024-02-20

## 2023-04-20 RX ORDER — FAMOTIDINE 20 MG/1
20 TABLET, FILM COATED ORAL 2 TIMES DAILY PRN
Qty: 60 TABLET | Refills: 3 | Status: SHIPPED | OUTPATIENT
Start: 2023-04-20

## 2023-04-20 RX ORDER — HYDROXYZINE HYDROCHLORIDE 25 MG/1
25 TABLET, FILM COATED ORAL 3 TIMES DAILY PRN
Qty: 30 TABLET | Refills: 3 | Status: SHIPPED | OUTPATIENT
Start: 2023-04-20

## 2023-04-20 ASSESSMENT — PAIN SCALES - GENERAL: PAINLEVEL: NO PAIN (0)

## 2023-04-20 NOTE — NURSING NOTE
Patient presents today for itching that started on her hands last week and has spread to her feet, neck, arms and abdomen.    Medication Reconciliation Complete    Amber Castillo LPN  4/20/2023 2:03 PM

## 2023-04-20 NOTE — PROGRESS NOTES
"  Assessment & Plan     Urticaria  Unclear cause. Advised to watch for viral illness. Could be a topical/chemical irritant vs food allergy. She will monitor for triggers. Treatment as below:   - hydrOXYzine (ATARAX) 25 MG tablet; Take 1 tablet (25 mg) by mouth 3 times daily as needed for itching  - cetirizine (ZYRTEC) 5 MG tablet; Take 2 tablets (10 mg) by mouth daily  - famotidine (PEPCID) 20 MG tablet; Take 1 tablet (20 mg) by mouth 2 times daily as needed (flare)    Mixed hyperlipidemia  Due for repeat labs in about 5 months. Discussed lifestyle changes   - Lipid Panel; Future    Prediabetes  Due for repeat labs in about 5 harris. Discussed lifestyle changes   - Hemoglobin A1c; Future                 No follow-ups on file.    Jessa Camargo MD  [facility]   Beverley is a 57 year old, presenting for the following health issues:  Derm Problem    HPI     Itching   - hands, feet, off and on for the last week  - last night itching in scalp   - now into legs and chest/trunk  - no new products or topicals   - no one else itching at home  - last for a half hour then stops  - no new foods  - no cold or viral symptoms   - no rash or hives              Review of Systems   Constitutional, HEENT, cardiovascular, pulmonary, gi and gu systems are negative, except as otherwise noted.      Objective    /60   Pulse 72   Temp 98  F (36.7  C)   Resp 16   Ht 1.613 m (5' 3.5\")   Wt 55.2 kg (121 lb 9.6 oz)   LMP  (LMP Unknown)   SpO2 97%   BMI 21.20 kg/m    Body mass index is 21.2 kg/m .  Physical Exam   GENERAL: healthy, alert and no distress  MS: no gross musculoskeletal defects noted, no edema  SKIN: excoriations on hands, forearms, upper neck/scalp, shins. No rash or hives   PSYCH: mentation appears normal, affect normal/bright                    "

## 2023-05-18 ENCOUNTER — TRANSFERRED RECORDS (OUTPATIENT)
Dept: HEALTH INFORMATION MANAGEMENT | Facility: OTHER | Age: 58
End: 2023-05-18
Payer: COMMERCIAL

## 2023-11-12 DIAGNOSIS — K29.00 ACUTE GASTRITIS, PRESENCE OF BLEEDING UNSPECIFIED, UNSPECIFIED GASTRITIS TYPE: ICD-10-CM

## 2023-11-16 RX ORDER — PANTOPRAZOLE SODIUM 40 MG/1
40 TABLET, DELAYED RELEASE ORAL PRN
Qty: 90 TABLET | Refills: 3 | Status: SHIPPED | OUTPATIENT
Start: 2023-11-16

## 2023-11-16 NOTE — TELEPHONE ENCOUNTER
[facility] sent Rx request for the following:      Requested Prescriptions   Pending Prescriptions Disp Refills    pantoprazole (PROTONIX) 40 MG EC tablet [Pharmacy Med Name: PANTOPRAZOLE SOD DR 40 MG TAB] 30 tablet 11     Sig: TAKE 1 TABLET (40 MG) BY MOUTH AS NEEDED FOR HEARTBURN       PPI Protocol Passed - 11/12/2023 12:33 PM   Last Prescription Date:   6/22/22  Last Fill Qty/Refills:         90, R-3    Last Office Visit:              4/20/23   Future Office visit:           none     Niya Wells RN on 11/16/2023 at 9:41 AM

## 2024-02-17 DIAGNOSIS — L50.9 URTICARIA: ICD-10-CM

## 2024-02-20 RX ORDER — CETIRIZINE HYDROCHLORIDE 5 MG/1
10 TABLET ORAL DAILY
Qty: 30 TABLET | Refills: 1 | Status: SHIPPED | OUTPATIENT
Start: 2024-02-20

## 2024-02-20 NOTE — TELEPHONE ENCOUNTER
[facility] sent Rx request for the following:      Requested Prescriptions   Pending Prescriptions Disp Refills    cetirizine (ZYRTEC) 5 MG tablet [Pharmacy Med Name: CETIRIZINE HCL 5 MG TABLET] 30 tablet 3     Sig: TAKE 2 TABLETS BY MOUTH EVERY DAY       Antihistamines Protocol Passed - 2/20/2024  2:09 PM   Last Prescription Date:   4/20/23  Last Fill Qty/Refills:         30, R-3    Last Office Visit:              4/20/23   Future Office visit:           none     Niya Wells RN on 2/20/2024 at 2:10 PM

## 2025-01-27 ENCOUNTER — MYC MEDICAL ADVICE (OUTPATIENT)
Dept: FAMILY MEDICINE | Facility: OTHER | Age: 60
End: 2025-01-27
Payer: COMMERCIAL

## 2025-01-28 NOTE — TELEPHONE ENCOUNTER
Pt had Lipid lab on 1/24. Per Comment, new lipid orders were placed to retest. Do you want this done in 3 months or sooner?     Tyrese Fuchs RN on 1/28/2025 at 9:04 AM

## 2025-01-29 NOTE — TELEPHONE ENCOUNTER
Just want to see what they are when she has been fasting for >8 hours so anytime she can schedule

## 2025-01-31 ENCOUNTER — LAB (OUTPATIENT)
Dept: LAB | Facility: OTHER | Age: 60
End: 2025-01-31
Attending: STUDENT IN AN ORGANIZED HEALTH CARE EDUCATION/TRAINING PROGRAM
Payer: COMMERCIAL

## 2025-01-31 DIAGNOSIS — L50.9 URTICARIA: Primary | ICD-10-CM

## 2025-01-31 DIAGNOSIS — E78.2 MIXED HYPERLIPIDEMIA: ICD-10-CM

## 2025-01-31 LAB
CHOLEST SERPL-MCNC: 252 MG/DL
FASTING STATUS PATIENT QL REPORTED: YES
HDLC SERPL-MCNC: 64 MG/DL
LDLC SERPL CALC-MCNC: 166 MG/DL
NONHDLC SERPL-MCNC: 188 MG/DL
TRIGL SERPL-MCNC: 108 MG/DL

## 2025-01-31 PROCEDURE — 82465 ASSAY BLD/SERUM CHOLESTEROL: CPT | Mod: ZL

## 2025-01-31 PROCEDURE — 36415 COLL VENOUS BLD VENIPUNCTURE: CPT | Mod: ZL

## 2025-01-31 NOTE — RESULT ENCOUNTER NOTE
The 10-year ASCVD risk score (Marilynn PACHECO, et al., 2019) is: 2.2%    Values used to calculate the score:      Age: 59 years      Sex: Female      Is Non- : No      Diabetic: No      Tobacco smoker: No      Systolic Blood Pressure: 116 mmHg      Is BP treated: No      HDL Cholesterol: 64 mg/dL      Total Cholesterol: 252 mg/dL

## 2025-02-03 RX ORDER — CETIRIZINE HYDROCHLORIDE 5 MG/1
10 TABLET ORAL DAILY
Qty: 180 TABLET | Refills: 3 | Status: SHIPPED | OUTPATIENT
Start: 2025-02-03

## 2025-02-03 NOTE — TELEPHONE ENCOUNTER
Jessa Bender  Patient/pharmacy requesting 90 day supply. Sparkle Jones RN on 2/3/2025 at 2:27 PM    Last Office Visit:              1/24/25 PCP   Future Office visit:           none     Requested Prescriptions   Pending Prescriptions Disp Refills    cetirizine (ZYRTEC) 5 MG tablet [Pharmacy Med Name: CETIRIZINE HCL 5 MG TABLET] 180 tablet 1     Sig: TAKE 2 TABLETS BY MOUTH EVERY DAY       Antihistamines Protocol Passed - 2/3/2025  2:22 PM        Passed - Patient is 3-64 years of age     Apply weight-based dosing for peds patients age 3 - 12 years of age.    Forward request to provider for patients under the age of 3 or over the age of 64.          Passed - Medication is active on med list        Passed - Recent (12 month) or future (90 days) visit with authorizing provider s specialty     The patient must have completed an in-person or virtual visit within the past 12 months or has a future visit scheduled within the next 90 days with the authorizing provider s specialty.  Urgent care and e-visits do not qualify as an office visit for this protocol.          Passed - Medication indicated for associated diagnosis     The medication is associated with one or more of the following diagnoses:  Allergies  Rhinitis  Upper respiratory tract allergy  Urticaria  Itching  Cystic Fibrosis  Bronchiectasis           Last Prescription Date:   1/24/25  Last Fill Qty/Refills:         30 / 1

## 2025-02-06 ENCOUNTER — THERAPY VISIT (OUTPATIENT)
Dept: PHYSICAL THERAPY | Facility: OTHER | Age: 60
End: 2025-02-06
Attending: STUDENT IN AN ORGANIZED HEALTH CARE EDUCATION/TRAINING PROGRAM
Payer: COMMERCIAL

## 2025-02-06 DIAGNOSIS — R35.0 URINARY FREQUENCY: ICD-10-CM

## 2025-02-06 PROCEDURE — 97110 THERAPEUTIC EXERCISES: CPT | Mod: GP

## 2025-02-06 PROCEDURE — 97140 MANUAL THERAPY 1/> REGIONS: CPT | Mod: GP

## 2025-02-06 PROCEDURE — 97161 PT EVAL LOW COMPLEX 20 MIN: CPT | Mod: GP

## 2025-02-06 NOTE — PROGRESS NOTES
PHYSICAL THERAPY EVALUATION  Type of Visit: Evaluation        Fall Risk Screen:  Fall screen completed by: PT  Have you fallen 2 or more times in the past year?: No  Have you fallen and had an injury in the past year?: No  Is patient a fall risk?: No    Subjective         Presenting condition or subjective complaint:  Patient referred to PT due to urinary urgency and frequency that is becoming a big problem in her daily lift. Has been going on for about 2 years, but worsening over the past few months. Thinks a lot of it is related to menopausal and hormonal changes. Has an appt with with Dr. Cooper soon to discuss HRT. Deals with hot flashes, urinary irritation, vaginal dryness. No issues with leakage or constipation.   Date of onset: 27    Relevant medical history:   No past medical history on file.    Dates & types of surgery:    Past Surgical History:   Procedure Laterality Date    ATTEMPTED ARTHROSCOPY      ,Lloyd Doshi--knee Right     SECTION      .2004    COLONOSCOPY  2015    5/15,5 year F/U--Dr Santos-hyperplastic polyp    EGD      approx , gastritis    HYSTERECTOMY, PAP NO LONGER INDICATED      LAPAROSCOPIC TUBAL LIGATION      ,[employer] [facility]    OTHER SURGICAL HISTORY      ,[id number],ABLATION,[place], [place]    OTHER SURGICAL HISTORY      3/10/2015,54564.0,LA LAP VAG HYST UTERUS 250GMS/<         Prior diagnostic imaging/testing results:    NA  Prior therapy history for the same diagnosis, illness or injury:    none    Prior Level of Function  Transfers: Independent  Ambulation: Independent  ADL: Independent      Patient goals for therapy:  reduce urgency    Pain assessment: Pain denied     Objective      PELVIC EVALUATION  ADDITIONAL HISTORY:    Bladder History:    Triggers for feeling of inability to wait to go to the bathroom:    yes  How long can you wait to urinate:  unable  Gets up at night to urinate:    one time  Fluid intake per day:      limited water  intake    Activities causing urine leak:    none  Amount of urine typically leaked:  none  Pads used to help with leaking:      none    Bowel History:  Frequency of bowel movement:  daily  Consistency of stool:    soft formed      Sexual Function History:    State of menopause:  unknown  Hormone medications:    none         Discussed reason for referral regarding pelvic health needs and external/internal pelvic floor muscle examination with patient/guardian.  Opportunity provided to ask questions and verbal consent for assessment and intervention was given.      POSTURE:  right shoulder inferior to left  LUMBAR SCREEN: AROM WNL    PELVIC/SI SCREEN:  WNL     PELVIC EXAM    Scar:   Location/Type: right suprapubic  Mobility: Hypomobile    Internal Digital Palpation:  Per Vagina:  Myofascial Resistance to Palpation: none  Digital Muscle Performance: P (Power): 5  E (Endurance): 10  R (Repetitions): 5  F (Fast Twitch): 5  Compensations: none      Fascial Tension/Restriction:  liver, falciform ligament      Assessment & Plan   CLINICAL IMPRESSIONS  Medical Diagnosis: R35.0 (ICD-10-CM) - Urinary frequency    Treatment Diagnosis: myofascial tightness   Impression/Assessment: Patient is a 59 year old female with urinary urgency and frequency complaints.  The following significant findings have been identified: Impaired muscle performance and Decreased activity tolerance. These impairments interfere with their ability to perform self care tasks as compared to previous level of function.     Clinical Decision Making (Complexity):  Clinical Presentation: Stable/Uncomplicated  Clinical Presentation Rationale: based on medical and personal factors listed in PT evaluation  Clinical Decision Making (Complexity): Low complexity    PLAN OF CARE  Treatment Interventions:    Interventions: Manual Therapy, Neuromuscular Re-education, Therapeutic Activity, Therapeutic Exercise, Self-Care/Home Management    Long Term Goals     PT Goal  1  Goal Identifier: water intake  Goal Description: Patient to get at least 60 ounces of water daily to reduce bladder irriation adding to urgency.  Target Date: 05/01/25  PT Goal 2  Goal Identifier: sleep  Goal Description: Patient to no longer wake in the night to void due to urgency.  Target Date: 05/01/25  PT Goal 3  Goal Identifier: daytime urgency  Goal Description: Patietn to voice success with urgency control during daytime to allow voiding every 2-3 hours instread of 45 minutes.  Target Date: 05/01/25      Frequency of Treatment: 1 time per week as needed  Duration of Treatment: 12 weeks    Recommended Referrals to Other Professionals:  NA  Education Assessment:   Learner/Method: Patient;No Barriers to Learning    Risks and benefits of evaluation/treatment have been explained.   Patient/Family/caregiver agrees with Plan of Care.     Evaluation Time:     PT Eval, Low Complexity Minutes (80199): 15       Signing Clinician: Joanne Lee, PT

## 2025-02-17 ENCOUNTER — OFFICE VISIT (OUTPATIENT)
Dept: OBGYN | Facility: OTHER | Age: 60
End: 2025-02-17
Attending: STUDENT IN AN ORGANIZED HEALTH CARE EDUCATION/TRAINING PROGRAM
Payer: COMMERCIAL

## 2025-02-17 VITALS
HEART RATE: 75 BPM | BODY MASS INDEX: 21.45 KG/M2 | DIASTOLIC BLOOD PRESSURE: 60 MMHG | WEIGHT: 123 LBS | SYSTOLIC BLOOD PRESSURE: 118 MMHG

## 2025-02-17 DIAGNOSIS — Z12.31 VISIT FOR SCREENING MAMMOGRAM: Primary | ICD-10-CM

## 2025-02-17 DIAGNOSIS — N95.2 VAGINAL ATROPHY: ICD-10-CM

## 2025-02-17 RX ORDER — ESTRADIOL 0.1 MG/G
2 CREAM VAGINAL
Qty: 42.5 G | Refills: 0 | Status: SHIPPED | OUTPATIENT
Start: 2025-02-17

## 2025-02-17 ASSESSMENT — PAIN SCALES - GENERAL: PAINLEVEL_OUTOF10: NO PAIN (0)

## 2025-02-17 NOTE — NURSING NOTE
"Chief Complaint   Patient presents with    Menopausal Sx     Patient is here for menopausal sx, patient would like to discuss HRT.   Initial /60   Pulse 75   Wt 55.8 kg (123 lb)   LMP  (LMP Unknown)   BMI 21.45 kg/m   Estimated body mass index is 21.45 kg/m  as calculated from the following:    Height as of 4/20/23: 1.613 m (5' 3.5\").    Weight as of this encounter: 55.8 kg (123 lb).  Medication Reconciliation: complete      Inna Palmer LPN    "

## 2025-02-17 NOTE — PROGRESS NOTES
GYN Visit    S: Ms. Beverley Henry is a 59 year old  here for discussion of menopausal symptoms. She has had a prior hysterectomy and bilateral salpingectomy for uterine fibroids (ovaries remained intact) and it has been hard to discern when she has been undergoing the menopause transition. We first met a few years ago with similar symptoms, and at that time was not interested in learning more about hormonal add-back therapy.    She is hoping to learn her options at this time. She is also closely working with a homeopathic provider and waiting for testing results back from a [place] [facility]. She notes she has started using yam cream, and is making a black cohost tincture currently.     We discussed the western medicine HRT options in detail and discussed the risks/benefits. She understands this and is planning to be mindful of her options in light of a family history of breast cancer in her maternal grandmother and maternal aunt. She herself has never had any breast, uterine or ovarian cancers. She has never had a VTE.     The most bothersome symptoms include vaginal dryness, overactive bladder and hot flashes.   O:  /60   Pulse 75   Wt 55.8 kg (123 lb)   LMP  (LMP Unknown)   BMI 21.45 kg/m    Gen: Well-appearing, NAD  Pulm: nonlabored    A/P:  Ms. Beverley Henry is a 59 year old  here for symptoms of menopause.    #Vaginal atrophy, bladder spasticity and vasomotor symptoms of menopause  - Discussed symptom relief options both non-hormonal and hormonal as noted above. She is going to utilize a combination of vaginal estrogen cream and naturalpathic approaches   -Vaginal estrogen taper: used nightly for 1 week, every other night for 1 week and then settle into a maintenance for twice weekly.   --Discussed we will use the smallest helpful dose for the shortest amount of time  --Discussed the importance of keeping up to date with mammograms. Due currently. Ordered today.      We discussed  that most common symptoms experienced by majority of women undergoing the menopause transition is vasomotor symptoms in the form of hot flushes as well as vaginal irritation and discomfort due to atrophy.  At this time the vaginal and bladder symptoms seem to be her most pressing concern.  She is interested in pursuing treatment for symptom relief and using a combination of HRT as well as natural remedies for menopause symptom relief.  We discussed that a number of studies have been performed in treatments for the symptoms and the FDA has approved both hormonal and nonhormonal agents to help. She understands these risks and they were factors in our joint decision on which treatment she would like to pursue. We discussed that hormonal treatments are noted to be more efficacious than non-hormonal, but the side effect and risk panel are slightly elevated to include the findings from the WHI study including slightly increased risk of breast cancer, VTE, CAD, and stroke.    Nonhormonal agents include:  - Paroxetine 7.5mg/day  - Clonidine 0.1mg/day (Will not use if BP low)    Hormonal agents:  - Vaginal estrogen cream used 1-2x weekly  - transdermal estradiol- 17 B 0.025-0.035mg/week. Transdermal approach has been noted to have a decreased rate of VTE compared to PO regimens       In further discussion of her risk factors she does not have a personal history of breast cancer, but does have two distant relatives with this history. She is due for a mammogram and this was ordered today. She has recent CMP which shows normal AST and ALT from January 2025.       Total amount of time spent during today's encounter including chart prep, face to face, exam and documentation was 38 minutes  Inna Hwang MD on 2/17/2025 at 1:03 PM

## 2025-05-17 ENCOUNTER — HEALTH MAINTENANCE LETTER (OUTPATIENT)
Age: 60
End: 2025-05-17

## 2025-05-29 ENCOUNTER — RESULTS FOLLOW-UP (OUTPATIENT)
Dept: FAMILY MEDICINE | Facility: OTHER | Age: 60
End: 2025-05-29

## 2025-05-29 ENCOUNTER — OFFICE VISIT (OUTPATIENT)
Dept: FAMILY MEDICINE | Facility: OTHER | Age: 60
End: 2025-05-29
Attending: STUDENT IN AN ORGANIZED HEALTH CARE EDUCATION/TRAINING PROGRAM
Payer: COMMERCIAL

## 2025-05-29 VITALS
RESPIRATION RATE: 16 BRPM | SYSTOLIC BLOOD PRESSURE: 102 MMHG | DIASTOLIC BLOOD PRESSURE: 60 MMHG | HEART RATE: 61 BPM | HEIGHT: 64 IN | TEMPERATURE: 97.8 F | BODY MASS INDEX: 20.72 KG/M2 | OXYGEN SATURATION: 98 % | WEIGHT: 121.38 LBS

## 2025-05-29 DIAGNOSIS — N95.1 MENOPAUSAL STATE: ICD-10-CM

## 2025-05-29 DIAGNOSIS — E78.2 MIXED HYPERLIPIDEMIA: ICD-10-CM

## 2025-05-29 DIAGNOSIS — Z13.9 SCREENING FOR CONDITION: ICD-10-CM

## 2025-05-29 DIAGNOSIS — Z82.49 FAMILY HISTORY OF HEART DISEASE: ICD-10-CM

## 2025-05-29 DIAGNOSIS — Z82.62 FAMILY HISTORY OF OSTEOPOROSIS: ICD-10-CM

## 2025-05-29 DIAGNOSIS — R53.82 CHRONIC FATIGUE: Primary | ICD-10-CM

## 2025-05-29 DIAGNOSIS — R63.5 WEIGHT GAIN: ICD-10-CM

## 2025-05-29 LAB
ALBUMIN SERPL BCG-MCNC: 4.3 G/DL (ref 3.5–5.2)
ALP SERPL-CCNC: 135 U/L (ref 40–150)
ALT SERPL W P-5'-P-CCNC: 20 U/L (ref 0–50)
ANION GAP SERPL CALCULATED.3IONS-SCNC: 10 MMOL/L (ref 7–15)
AST SERPL W P-5'-P-CCNC: 24 U/L (ref 0–45)
BASOPHILS # BLD AUTO: 0 10E3/UL (ref 0–0.2)
BASOPHILS NFR BLD AUTO: 1 %
BILIRUB SERPL-MCNC: 0.3 MG/DL
BUN SERPL-MCNC: 15.9 MG/DL (ref 6–20)
CALCIUM SERPL-MCNC: 9.5 MG/DL (ref 8.8–10.4)
CHLORIDE SERPL-SCNC: 106 MMOL/L (ref 98–107)
CREAT SERPL-MCNC: 0.8 MG/DL (ref 0.51–0.95)
EGFRCR SERPLBLD CKD-EPI 2021: 85 ML/MIN/1.73M2
EOSINOPHIL # BLD AUTO: 0.1 10E3/UL (ref 0–0.7)
EOSINOPHIL NFR BLD AUTO: 2 %
ERYTHROCYTE [DISTWIDTH] IN BLOOD BY AUTOMATED COUNT: 12.2 % (ref 10–15)
GLUCOSE SERPL-MCNC: 106 MG/DL (ref 70–99)
HCO3 SERPL-SCNC: 25 MMOL/L (ref 22–29)
HCT VFR BLD AUTO: 41.1 % (ref 35–47)
HGB BLD-MCNC: 14.5 G/DL (ref 11.7–15.7)
IMM GRANULOCYTES # BLD: 0 10E3/UL
IMM GRANULOCYTES NFR BLD: 0 %
IRON BINDING CAPACITY (ROCHE): 301 UG/DL (ref 240–430)
IRON SATN MFR SERPL: 29 % (ref 15–46)
IRON SERPL-MCNC: 88 UG/DL (ref 37–145)
LYMPHOCYTES # BLD AUTO: 1.7 10E3/UL (ref 0.8–5.3)
LYMPHOCYTES NFR BLD AUTO: 35 %
MCH RBC QN AUTO: 31.8 PG (ref 26.5–33)
MCHC RBC AUTO-ENTMCNC: 35.3 G/DL (ref 31.5–36.5)
MCV RBC AUTO: 90 FL (ref 78–100)
MONOCYTES # BLD AUTO: 0.4 10E3/UL (ref 0–1.3)
MONOCYTES NFR BLD AUTO: 8 %
NEUTROPHILS # BLD AUTO: 2.6 10E3/UL (ref 1.6–8.3)
NEUTROPHILS NFR BLD AUTO: 53 %
NRBC # BLD AUTO: 0 10E3/UL
NRBC BLD AUTO-RTO: 0 /100
PLATELET # BLD AUTO: 266 10E3/UL (ref 150–450)
POTASSIUM SERPL-SCNC: 4.3 MMOL/L (ref 3.4–5.3)
PROT SERPL-MCNC: 7 G/DL (ref 6.4–8.3)
RBC # BLD AUTO: 4.56 10E6/UL (ref 3.8–5.2)
SODIUM SERPL-SCNC: 141 MMOL/L (ref 135–145)
T4 FREE SERPL-MCNC: 1.09 NG/DL (ref 0.9–1.7)
TSH SERPL DL<=0.005 MIU/L-ACNC: 0.85 UIU/ML (ref 0.3–4.2)
VIT B12 SERPL-MCNC: 780 PG/ML (ref 232–1245)
WBC # BLD AUTO: 4.9 10E3/UL (ref 4–11)

## 2025-05-29 PROCEDURE — 84439 ASSAY OF FREE THYROXINE: CPT | Mod: ZL | Performed by: STUDENT IN AN ORGANIZED HEALTH CARE EDUCATION/TRAINING PROGRAM

## 2025-05-29 PROCEDURE — 36415 COLL VENOUS BLD VENIPUNCTURE: CPT | Mod: ZL | Performed by: STUDENT IN AN ORGANIZED HEALTH CARE EDUCATION/TRAINING PROGRAM

## 2025-05-29 PROCEDURE — 85025 COMPLETE CBC W/AUTO DIFF WBC: CPT | Mod: ZL | Performed by: STUDENT IN AN ORGANIZED HEALTH CARE EDUCATION/TRAINING PROGRAM

## 2025-05-29 PROCEDURE — 84443 ASSAY THYROID STIM HORMONE: CPT | Mod: ZL | Performed by: STUDENT IN AN ORGANIZED HEALTH CARE EDUCATION/TRAINING PROGRAM

## 2025-05-29 PROCEDURE — 82607 VITAMIN B-12: CPT | Mod: ZL | Performed by: STUDENT IN AN ORGANIZED HEALTH CARE EDUCATION/TRAINING PROGRAM

## 2025-05-29 PROCEDURE — 82040 ASSAY OF SERUM ALBUMIN: CPT | Mod: ZL | Performed by: STUDENT IN AN ORGANIZED HEALTH CARE EDUCATION/TRAINING PROGRAM

## 2025-05-29 PROCEDURE — 83550 IRON BINDING TEST: CPT | Mod: ZL | Performed by: STUDENT IN AN ORGANIZED HEALTH CARE EDUCATION/TRAINING PROGRAM

## 2025-05-29 ASSESSMENT — ENCOUNTER SYMPTOMS: FATIGUE: 1

## 2025-05-29 ASSESSMENT — PAIN SCALES - GENERAL: PAINLEVEL_OUTOF10: NO PAIN (0)

## 2025-05-29 NOTE — PROGRESS NOTES
Assessment & Plan   Problem List Items Addressed This Visit    None  Visit Diagnoses         Chronic fatigue    -  Primary    Relevant Orders    TSH Reflex GH    T4, Free (Completed)    T3, Total    Thyroid peroxidase antibody    Iron & Iron Binding Capacity (Completed)    Comprehensive Metabolic Panel (Completed)    CBC and Differential (Completed)    Vitamin D Total    Vitamin B12 (Completed)    TSH (Completed)      Weight gain        Relevant Orders    TSH Reflex GH    T4, Free (Completed)    T3, Total    Thyroid peroxidase antibody    TSH (Completed)      Screening for condition        Relevant Orders    TSH Reflex GH    T4, Free (Completed)    T3, Total    Thyroid peroxidase antibody    Iron & Iron Binding Capacity (Completed)    Comprehensive Metabolic Panel (Completed)    CBC and Differential (Completed)    Vitamin D Total    Vitamin B12 (Completed)    TSH (Completed)      Menopausal state        Relevant Orders    DX Bone Density      Family history of osteoporosis        Relevant Orders    DX Bone Density      Family history of heart disease        Relevant Orders    CT Coronary Calcium Scan      Mixed hyperlipidemia        Relevant Orders    CT Coronary Calcium Scan           Family  history of osteoporosis, ordered dexa scan   Family history of heart disease, previous elevated cholesterol levels-- ordered CT coronary calcium scan   4 months ago had normal TSH. A1c has been in pre-diabetic range in the past. Patient wants further testing. Will order those listed above and screen for vitamin deficiencies. So far labs have been returning in normal range, so suspect this is more menopause related symptoms. Consider sleep study if on going fatigue     The longitudinal plan of care for the diagnosis(es)/condition(s) as documented were addressed during this visit. Due to the added complexity in care, I will continue to support Beverley in the subsequent management and with ongoing continuity of care.          "      Traci Lowery is a 59 year old, presenting for the following health issues:  Fatigue (Weight gain, menopause, hormones, hair loss with thinning )    Fatigue  Associated symptoms include fatigue.       Here to discuss: fatigue, tired, weight gain, hair thinning. Has met with obgyn regarding menopause symptoms  No sleep apnea symptoms   Eats a balanced diet. Is wondering about other levels such as vitamin D and B12    Family history of heart disease and osteoporosis, would like a dexa scan and CT calcium score ordered               Review of Systems  Constitutional, HEENT, cardiovascular, pulmonary, gi and gu systems are negative, except as otherwise noted.      Objective    /60 (BP Location: Right arm, Patient Position: Sitting, Cuff Size: Adult Regular)   Pulse 61   Temp 97.8  F (36.6  C) (Tympanic)   Resp 16   Ht 1.626 m (5' 4\")   Wt 55.1 kg (121 lb 6 oz)   LMP  (LMP Unknown)   SpO2 98%   BMI 20.83 kg/m    Body mass index is 20.83 kg/m .  Physical Exam   GENERAL: alert and no distress  NECK: no adenopathy, no asymmetry, masses, or scars  RESP: lungs clear to auscultation - no rales, rhonchi or wheezes  CV: regular rate and rhythm, normal S1 S2, no S3 or S4, no murmur, click or rub, no peripheral edema   PSYCH: mentation appears normal, affect normal/bright    Results for orders placed or performed in visit on 05/29/25 (from the past 24 hours)   T4, Free   Result Value Ref Range    Free T4 1.09 0.90 - 1.70 ng/dL   Iron & Iron Binding Capacity   Result Value Ref Range    Iron 88 37 - 145 ug/dL    Iron Binding Capacity 301 240 - 430 ug/dL    Iron Sat Index 29 15 - 46 %   Comprehensive Metabolic Panel   Result Value Ref Range    Sodium 141 135 - 145 mmol/L    Potassium 4.3 3.4 - 5.3 mmol/L    Carbon Dioxide (CO2) 25 22 - 29 mmol/L    Anion Gap 10 7 - 15 mmol/L    Urea Nitrogen 15.9 6.0 - 20.0 mg/dL    Creatinine 0.80 0.51 - 0.95 mg/dL    GFR Estimate 85 >60 mL/min/1.73m2    Calcium 9.5 8.8 - " 10.4 mg/dL    Chloride 106 98 - 107 mmol/L    Glucose 106 (H) 70 - 99 mg/dL    Alkaline Phosphatase 135 40 - 150 U/L    AST 24 0 - 45 U/L    ALT 20 0 - 50 U/L    Protein Total 7.0 6.4 - 8.3 g/dL    Albumin 4.3 3.5 - 5.2 g/dL    Bilirubin Total 0.3 <=1.2 mg/dL   CBC and Differential    Narrative    The following orders were created for panel order CBC and Differential.  Procedure                               Abnormality         Status                     ---------                               -----------         ------                     CBC with platelets and ...[[id number]]                      Final result                 Please view results for these tests on the individual orders.   Vitamin B12   Result Value Ref Range    Vitamin B12 780 232 - 1,245 pg/mL   CBC with platelets and differential   Result Value Ref Range    WBC Count 4.9 4.0 - 11.0 10e3/uL    RBC Count 4.56 3.80 - 5.20 10e6/uL    Hemoglobin 14.5 11.7 - 15.7 g/dL    Hematocrit 41.1 35.0 - 47.0 %    MCV 90 78 - 100 fL    MCH 31.8 26.5 - 33.0 pg    MCHC 35.3 31.5 - 36.5 g/dL    RDW 12.2 10.0 - 15.0 %    Platelet Count 266 150 - 450 10e3/uL    % Neutrophils 53 %    % Lymphocytes 35 %    % Monocytes 8 %    % Eosinophils 2 %    % Basophils 1 %    % Immature Granulocytes 0 %    NRBCs per 100 WBC 0 <1 /100    Absolute Neutrophils 2.6 1.6 - 8.3 10e3/uL    Absolute Lymphocytes 1.7 0.8 - 5.3 10e3/uL    Absolute Monocytes 0.4 0.0 - 1.3 10e3/uL    Absolute Eosinophils 0.1 0.0 - 0.7 10e3/uL    Absolute Basophils 0.0 0.0 - 0.2 10e3/uL    Absolute Immature Granulocytes 0.0 <=0.4 10e3/uL    Absolute NRBCs 0.0 10e3/uL   TSH   Result Value Ref Range    TSH 0.85 0.30 - 4.20 uIU/mL           Signed Electronically by: Jessa Camargo MD

## 2025-05-29 NOTE — NURSING NOTE
"Chief Complaint   Patient presents with    Fatigue     Weight gain, menopause, hormones, hair loss with thinning        Initial /60 (BP Location: Right arm, Patient Position: Sitting, Cuff Size: Adult Regular)   Pulse 61   Temp 97.8  F (36.6  C) (Tympanic)   Resp 16   Ht 1.626 m (5' 4\")   Wt 55.1 kg (121 lb 6 oz)   LMP  (LMP Unknown)   SpO2 98%   BMI 20.83 kg/m   Estimated body mass index is 20.83 kg/m  as calculated from the following:    Height as of this encounter: 1.626 m (5' 4\").    Weight as of this encounter: 55.1 kg (121 lb 6 oz).  Medication Review: complete    The next two questions are to help us understand your food security.  If you are feeling you need any assistance in this area, we have resources available to support you today.           No data to display                  Health Care Directive:  Patient does not have a Health Care Directive: Discussed advance care planning with patient; however, patient declined at this time.    Radha Wilson LPN      "

## 2025-06-09 ENCOUNTER — HOSPITAL ENCOUNTER (OUTPATIENT)
Dept: BONE DENSITY | Facility: HOSPITAL | Age: 60
Discharge: HOME | End: 2025-06-09
Attending: STUDENT IN AN ORGANIZED HEALTH CARE EDUCATION/TRAINING PROGRAM | Admitting: STUDENT IN AN ORGANIZED HEALTH CARE EDUCATION/TRAINING PROGRAM
Payer: COMMERCIAL

## 2025-06-09 DIAGNOSIS — N95.1 MENOPAUSAL STATE: ICD-10-CM

## 2025-06-09 DIAGNOSIS — Z82.62 FAMILY HISTORY OF OSTEOPOROSIS: ICD-10-CM

## 2025-06-09 PROCEDURE — 77080 DXA BONE DENSITY AXIAL: CPT

## 2025-06-09 PROCEDURE — 77080 DXA BONE DENSITY AXIAL: CPT | Mod: 26 | Performed by: STUDENT IN AN ORGANIZED HEALTH CARE EDUCATION/TRAINING PROGRAM
